# Patient Record
Sex: MALE | Race: BLACK OR AFRICAN AMERICAN | NOT HISPANIC OR LATINO | URBAN - METROPOLITAN AREA
[De-identification: names, ages, dates, MRNs, and addresses within clinical notes are randomized per-mention and may not be internally consistent; named-entity substitution may affect disease eponyms.]

---

## 2022-03-29 ENCOUNTER — EMERGENCY (EMERGENCY)
Facility: HOSPITAL | Age: 52
LOS: 1 days | Discharge: HOME | End: 2022-03-29
Attending: STUDENT IN AN ORGANIZED HEALTH CARE EDUCATION/TRAINING PROGRAM
Payer: MEDICAID

## 2022-03-29 VITALS
DIASTOLIC BLOOD PRESSURE: 95 MMHG | OXYGEN SATURATION: 99 % | RESPIRATION RATE: 18 BRPM | HEART RATE: 66 BPM | TEMPERATURE: 98 F | SYSTOLIC BLOOD PRESSURE: 157 MMHG

## 2022-03-29 VITALS
SYSTOLIC BLOOD PRESSURE: 202 MMHG | DIASTOLIC BLOOD PRESSURE: 120 MMHG | RESPIRATION RATE: 18 BRPM | HEIGHT: 75 IN | WEIGHT: 195.11 LBS | HEART RATE: 82 BPM | OXYGEN SATURATION: 100 %

## 2022-03-29 LAB
ALBUMIN SERPL ELPH-MCNC: 4.7 G/DL — SIGNIFICANT CHANGE UP (ref 3.3–5)
ALP SERPL-CCNC: 58 U/L — SIGNIFICANT CHANGE UP (ref 40–120)
ALT FLD-CCNC: 24 U/L — SIGNIFICANT CHANGE UP (ref 10–45)
ANION GAP SERPL CALC-SCNC: 14 MMOL/L — SIGNIFICANT CHANGE UP (ref 5–17)
AST SERPL-CCNC: 16 U/L — SIGNIFICANT CHANGE UP (ref 10–40)
BASOPHILS # BLD AUTO: 0.03 K/UL — SIGNIFICANT CHANGE UP (ref 0–0.2)
BASOPHILS NFR BLD AUTO: 0.4 % — SIGNIFICANT CHANGE UP (ref 0–2)
BILIRUB SERPL-MCNC: 0.4 MG/DL — SIGNIFICANT CHANGE UP (ref 0.2–1.2)
BUN SERPL-MCNC: 18 MG/DL — SIGNIFICANT CHANGE UP (ref 7–23)
CALCIUM SERPL-MCNC: 9.8 MG/DL — SIGNIFICANT CHANGE UP (ref 8.4–10.5)
CHLORIDE SERPL-SCNC: 102 MMOL/L — SIGNIFICANT CHANGE UP (ref 96–108)
CO2 SERPL-SCNC: 24 MMOL/L — SIGNIFICANT CHANGE UP (ref 22–31)
CREAT SERPL-MCNC: 1.89 MG/DL — HIGH (ref 0.5–1.3)
EGFR: 42 ML/MIN/1.73M2 — LOW
EOSINOPHIL # BLD AUTO: 0.07 K/UL — SIGNIFICANT CHANGE UP (ref 0–0.5)
EOSINOPHIL NFR BLD AUTO: 1 % — SIGNIFICANT CHANGE UP (ref 0–6)
GLUCOSE SERPL-MCNC: 116 MG/DL — HIGH (ref 70–99)
HCT VFR BLD CALC: 44.6 % — SIGNIFICANT CHANGE UP (ref 39–50)
HGB BLD-MCNC: 15.3 G/DL — SIGNIFICANT CHANGE UP (ref 13–17)
IMM GRANULOCYTES NFR BLD AUTO: 0.3 % — SIGNIFICANT CHANGE UP (ref 0–1.5)
LYMPHOCYTES # BLD AUTO: 1.66 K/UL — SIGNIFICANT CHANGE UP (ref 1–3.3)
LYMPHOCYTES # BLD AUTO: 23.8 % — SIGNIFICANT CHANGE UP (ref 13–44)
MCHC RBC-ENTMCNC: 27.8 PG — SIGNIFICANT CHANGE UP (ref 27–34)
MCHC RBC-ENTMCNC: 34.3 GM/DL — SIGNIFICANT CHANGE UP (ref 32–36)
MCV RBC AUTO: 80.9 FL — SIGNIFICANT CHANGE UP (ref 80–100)
MONOCYTES # BLD AUTO: 0.48 K/UL — SIGNIFICANT CHANGE UP (ref 0–0.9)
MONOCYTES NFR BLD AUTO: 6.9 % — SIGNIFICANT CHANGE UP (ref 2–14)
NEUTROPHILS # BLD AUTO: 4.72 K/UL — SIGNIFICANT CHANGE UP (ref 1.8–7.4)
NEUTROPHILS NFR BLD AUTO: 67.6 % — SIGNIFICANT CHANGE UP (ref 43–77)
NRBC # BLD: 0 /100 WBCS — SIGNIFICANT CHANGE UP (ref 0–0)
PLATELET # BLD AUTO: 222 K/UL — SIGNIFICANT CHANGE UP (ref 150–400)
POTASSIUM SERPL-MCNC: 3.5 MMOL/L — SIGNIFICANT CHANGE UP (ref 3.5–5.3)
POTASSIUM SERPL-SCNC: 3.5 MMOL/L — SIGNIFICANT CHANGE UP (ref 3.5–5.3)
PROT SERPL-MCNC: 7.5 G/DL — SIGNIFICANT CHANGE UP (ref 6–8.3)
RBC # BLD: 5.51 M/UL — SIGNIFICANT CHANGE UP (ref 4.2–5.8)
RBC # FLD: 13.3 % — SIGNIFICANT CHANGE UP (ref 10.3–14.5)
SODIUM SERPL-SCNC: 140 MMOL/L — SIGNIFICANT CHANGE UP (ref 135–145)
WBC # BLD: 6.98 K/UL — SIGNIFICANT CHANGE UP (ref 3.8–10.5)
WBC # FLD AUTO: 6.98 K/UL — SIGNIFICANT CHANGE UP (ref 3.8–10.5)

## 2022-03-29 PROCEDURE — 80053 COMPREHEN METABOLIC PANEL: CPT

## 2022-03-29 PROCEDURE — 70450 CT HEAD/BRAIN W/O DYE: CPT | Mod: MA

## 2022-03-29 PROCEDURE — 36415 COLL VENOUS BLD VENIPUNCTURE: CPT

## 2022-03-29 PROCEDURE — 85025 COMPLETE CBC W/AUTO DIFF WBC: CPT

## 2022-03-29 PROCEDURE — 99285 EMERGENCY DEPT VISIT HI MDM: CPT

## 2022-03-29 PROCEDURE — 99284 EMERGENCY DEPT VISIT MOD MDM: CPT | Mod: 25

## 2022-03-29 PROCEDURE — 96374 THER/PROPH/DIAG INJ IV PUSH: CPT

## 2022-03-29 PROCEDURE — 70450 CT HEAD/BRAIN W/O DYE: CPT | Mod: 26,MA

## 2022-03-29 RX ORDER — ACETAMINOPHEN 500 MG
975 TABLET ORAL ONCE
Refills: 0 | Status: DISCONTINUED | OUTPATIENT
Start: 2022-03-29 | End: 2022-03-29

## 2022-03-29 RX ORDER — METOCLOPRAMIDE HCL 10 MG
10 TABLET ORAL ONCE
Refills: 0 | Status: DISCONTINUED | OUTPATIENT
Start: 2022-03-29 | End: 2022-03-29

## 2022-03-29 RX ORDER — METOCLOPRAMIDE HCL 10 MG
10 TABLET ORAL ONCE
Refills: 0 | Status: COMPLETED | OUTPATIENT
Start: 2022-03-29 | End: 2022-03-29

## 2022-03-29 RX ORDER — ACETAMINOPHEN 500 MG
975 TABLET ORAL ONCE
Refills: 0 | Status: COMPLETED | OUTPATIENT
Start: 2022-03-29 | End: 2022-03-29

## 2022-03-29 RX ADMIN — Medication 975 MILLIGRAM(S): at 07:11

## 2022-03-29 RX ADMIN — Medication 10 MILLIGRAM(S): at 07:11

## 2022-03-29 NOTE — ED ADULT NURSE REASSESSMENT NOTE - NS ED NURSE REASSESS COMMENT FT1
received report from glenn rn, pt now requesting previously refused meds, plan to medicate for pain and reassess, plan for d/c.

## 2022-03-29 NOTE — ED PROVIDER NOTE - NSICDXPASTMEDICALHX_GEN_ALL_CORE_FT
PAST MEDICAL HISTORY:  DVT, lower extremity     HTN (hypertension)     Polycystic kidney disease

## 2022-03-29 NOTE — ED PROVIDER NOTE - NSFOLLOWUPINSTRUCTIONS_ED_ALL_ED_FT
1. You presented to the emergency department for: Headache    2. Your evaluation in the emergency department included a physician evaluation and testing consisting of: CT scans and labwork. These tests did not reveal any findings indicating the need for admission to the hospital or an emergent intervention at this time.    3. It is recommended that you follow-up with a primary doctor within 5-7 days as discussed for a repeat evaluation, and potentially further testing and treatment. You can also see a cardiologist to discuss further management of your high blood pressure. We have requested assistance of the hospital to help you make these appointments. The contact information to arrange an appointment is also available in your paper work.    4. Please continue taking your regular medications as prescribed.    For pain you may take 1000mg Tylenol every 6 hours - as needed.  This is an over-the-counter medication - please respect the warnings on the label. This medication comes with certain risks and side effects that you need to discuss with your doctor, especially if you are taking it for a prolonged period of time.    5. PLEASE RETURN TO THE EMERGENCY DEPARTMENT IMMEDIATELY IF you have any fevers, uncontrollable nausea and vomiting, lightheadedness, inability to tolerate eating and drinking, difficulty breathing, severe increase in symptoms or pain, or an other new symptoms that concern you.

## 2022-03-29 NOTE — ED PROVIDER NOTE - OBJECTIVE STATEMENT
53 y/o M PMH HTN, polycystic kidney disease, DVT on eliquis presenting with 1 day of R-sided waxing and waning sharp HA that radiates down to R side of face. 53 y/o M PMH HTN, polycystic kidney disease, DVT on eliquis presenting with 1 day of R-sided waxing and waning sharp HA that radiates down to R side of face. PT reports he woke up with this headache, felt in normal state of health yesterday. Headache has been waxing and waning throughout the day and is associated with nausea and intermittent blurred vision in his L eye. Denies any fevers/chills, neck pain, chest pain, palpitations, SOB, lightheadedness/syncope, n/v, urinary sxs, LE swelling, numbness, weakness or skin changes. Did not take any pain medications prior to arrival as he prefers not to take pain medications. Notes BP elevated at home to 150s today. 51 y/o M PMH HTN, polycystic kidney disease, DVT on eliquis presenting with 1 day of R-sided waxing and waning sharp HA that radiates down to R side of face. Pt reports he woke up with this headache, felt in normal state of health yesterday. Headache has been waxing and waning throughout the day and is associated with nausea and intermittent blurred vision in his L eye. Denies any fevers/chills, neck pain, chest pain, palpitations, SOB, lightheadedness/syncope, n/v, urinary sxs, LE swelling, numbness, weakness or skin changes. Did not take any pain medications prior to arrival as he prefers not to take pain medications. Notes BP elevated at home to 150s today.

## 2022-03-29 NOTE — ED PROVIDER NOTE - PHYSICAL EXAMINATION
PHYSICAL EXAM:  GENERAL: Sitting comfortable in bed, in no acute distress  HENMT: Atraumatic, moist mucous membranes  EYES: Clear bilaterally, PERRL, EOMs intact b/l  HEART: RRR, nl S1/S2, no murmur/gallops/rubs  RESPIRATORY: Clear to auscultation bilaterally, no wheezes/rhonchi/rales  ABDOMEN: +BS, soft, nontender, nondistended  EXTREMITIES: No lower extremity edema, +2 radial pulses b/l  NEURO:  A&Ox4, CN II-XII intact, no dysmetria, no pronator drift, no focal motor deficits or sensory deficits   Heme/LYMPH: No ecchymosis or bruising, no anterior/posterior cervical or supraclavicular LAD  SKIN:  Skin warm, dry and intact. No evidence of rash.

## 2022-03-29 NOTE — ED PROVIDER NOTE - CLINICAL SUMMARY MEDICAL DECISION MAKING FREE TEXT BOX
51 y/o M PMH HTN, polycystic kidney disease, DVT on eliquis presenting with 1 day of R-sided waxing and waning sharp HA that radiates down to R side of face a/w intermittent R eye blurry vision and nausea. /120 on arrival, otherwise stable vitals. Afebrile. No focal findings on exam. HA likely migraine v less likely acute bleed in setting of eliquis use. Will obtain screening labs, CTH w/o contrast, proceed w/ tylenol and reglan for pain control and reassess. 53 y/o M PMH HTN, polycystic kidney disease, DVT on eliquis presenting with 1 day of R-sided waxing and waning sharp HA that radiates down to R side of face a/w intermittent R eye blurry vision and nausea. /120 on arrival, otherwise stable vitals. Afebrile. No focal findings on exam. HA likely migraine v less likely acute bleed in setting of eliquis use. Will obtain screening labs, CTH w/o contrast, proceed w/ tylenol and reglan for pain control and reassess.    Attending Nello: 53 y/o M w/ PMH ot HTN, polycystic kidney disease w/ CKD, DVT on eliquis presenting w/ hypertensive and headache. [place]en in [place], w/ wife. Reports not feeling well over the past day. Today had difficulty sleeping and woke up w/ R sided headache. Pain intermittent, sharp, radiates down to R jaw. Not maximal pain on onset. Pain worsened w/ change in position and turning head. No recent falls or head trauma. No pain meds at home. Noticed BP was high as well, 200 systolic. Endorses compliance w/ meds. Pt well appearing on exam, no acute distress. Lungs clear. HR regular. Abd nondistended/soft/nontender. CN 2-12 grossly intact b/l. Str 5/5 x4. No appreciable sensory deficits. No slurred speech. Pt w/ R sided headache and hypertension. Non focal neuro exam, low suspicion for ICH but given headache, eliquis use, and elevated BPs will obtain screening CT head. Hypertension likely from headache, will provide analgesia. Plan for labs, imaging, meds. Will reassess the need for additional interventions as clinically warranted.

## 2022-03-29 NOTE — ED ADULT NURSE NOTE - NSIMPLEMENTINTERV_GEN_ALL_ED
Implemented All Universal Safety Interventions:  [place] to call system. Call bell, personal items and telephone within reach. Instruct patient to call for assistance. Room bathroom lighting operational. Non-slip footwear when patient is off stretcher. Physically safe environment: no spills, clutter or unnecessary equipment. Stretcher in lowest position, wheels locked, appropriate side rails in place.

## 2022-03-29 NOTE — ED PROVIDER NOTE - ATTENDING CONTRIBUTION TO CARE
Attending Kj: I performed a history and physical exam of the patient and discussed their management with the resident/fellow/ACP/student. I have reviewed the resident/fellow/ACP/student note and agree with the documented findings and plan of care, except as noted. I have personally performed a substantive portion of the visit including all aspects of the medical decision making. My medical decision making and observations are found above. Please refer to any progress notes for updates on clinical course.

## 2022-03-29 NOTE — ED ADULT NURSE REASSESSMENT NOTE - NS ED NURSE REASSESS COMMENT FT1
Pt's BP trending down appropriately per Kj MCNAMARA. Pt endorses moderate relief of Rt-sided H/A at this time.

## 2022-03-29 NOTE — ED ADULT NURSE REASSESSMENT NOTE - NS ED NURSE REASSESS COMMENT FT1
Pt declines Tylenol & Reglan despite reporting 10/10 H/A, states he is concerned that these meds will not treat his BP. Education provided to pt that analgesia may contribute to lowering his BP safely, & that his BP would be closely monitored. Explained that decreasing his BP too much too quickly may not be safe & has risks, so it is preferred to start with analgesia at this time per Kj MCNAMARA. Kj MCNAMARA aware, at bedside speaking with pt.

## 2022-03-29 NOTE — ED PROVIDER NOTE - PROGRESS NOTE DETAILS
Attending Kj: pt refusing pain meds, stating his headache has improved. CT neg for acute pathology. Attending Kj: pt had been cleared for discharged and follow up plan explained in length. Provided referral information for PCP and cards follow up. Pt verbalized understanding. Upon attempting to discharge pt he is now requesting that he be given the tylenol and reglan previously ordered that he refused earlier, stating that he wants the medications now because he wanted to wait "until I made sure everything was ok." Tylenol and reglan ordered, will discharge after administration.

## 2022-03-29 NOTE — ED PROVIDER NOTE - NS ED ROS FT
Constitutional: no fever and no chills  Eyes: no discharge, no pain, no visual changes  ENMT: no ear pain, no dysphagia or throat pain  Neck: no pain, no stiffness  CV: no chest pain, no palpitations, no edema  Resp: no cough, no shortness of breath  Abd: no abdominal pain, (+) nausea, no vomiting, no diarrhea  : no dysuria, no hematuria  MSK: no back pain, no neck pain, no joint pain  Neuro: no LOC, no gait abnormality, (+) headache radiating to R side of face, no sensory deficits, no weakness  Skin: no rashes, no lacerations Constitutional: no fever and no chills  Eyes: no discharge, no pain, (+) intermittent R eye blurry vision  ENMT: no ear pain, no dysphagia or throat pain  Neck: no pain, no stiffness  CV: no chest pain, no palpitations, no edema  Resp: no cough, no shortness of breath  Abd: no abdominal pain, (+) nausea, no vomiting, no diarrhea  : no dysuria, no hematuria  MSK: no back pain, no neck pain, no joint pain  Neuro: no LOC, no gait abnormality, (+) headache radiating to R side of face, no sensory deficits, no weakness  Skin: no rashes, no lacerations

## 2022-03-29 NOTE — ED PROVIDER NOTE - PATIENT PORTAL LINK FT
You can access the FollowMyHealth Patient Portal offered by [facility] by registering at the following website: [email or web address]. By joining [facility]’s FollowMyHealth portal, you will also be able to view your health information using other applications (apps) compatible with our system.